# Patient Record
Sex: MALE | Race: BLACK OR AFRICAN AMERICAN | URBAN - METROPOLITAN AREA
[De-identification: names, ages, dates, MRNs, and addresses within clinical notes are randomized per-mention and may not be internally consistent; named-entity substitution may affect disease eponyms.]

---

## 2017-05-27 ENCOUNTER — EMERGENCY (EMERGENCY)
Facility: HOSPITAL | Age: 22
LOS: 1 days | Discharge: HOME | End: 2017-05-27
Admitting: EMERGENCY MEDICINE
Payer: COMMERCIAL

## 2017-05-27 VITALS
RESPIRATION RATE: 17 BRPM | OXYGEN SATURATION: 97 % | SYSTOLIC BLOOD PRESSURE: 142 MMHG | HEART RATE: 93 BPM | TEMPERATURE: 98 F | DIASTOLIC BLOOD PRESSURE: 72 MMHG

## 2017-05-27 PROCEDURE — 99284 EMERGENCY DEPT VISIT MOD MDM: CPT

## 2017-05-27 RX ORDER — FLUTICASONE PROPIONATE 50 MCG
1 SPRAY, SUSPENSION NASAL
Qty: 1 | Refills: 0 | OUTPATIENT
Start: 2017-05-27

## 2017-05-27 RX ORDER — ALBUTEROL 90 UG/1
2 AEROSOL, METERED ORAL
Qty: 1 | Refills: 0 | OUTPATIENT
Start: 2017-05-27

## 2017-05-27 NOTE — ED PROVIDER NOTE - OBJECTIVE STATEMENT
22 y/o male no PMH presents to ED c/o congestion cough wheeze x 2 days. Pt. states 2 days ago he developed congestion and facial pressure which has progressed to worsening cough and some mild wheeze - states similar symptoms one year ago and was given inhaler and cough meds and got better. denies cp fever chills nt sweates weakness.

## 2017-05-27 NOTE — ED ADULT TRIAGE NOTE - CHIEF COMPLAINT QUOTE
pt c/o of congestion and cough x 3 days. no coughing in triage. pt well appearing. rr even and unlabored.

## 2019-09-11 NOTE — ED ADULT TRIAGE NOTE - STATUS:
Applied [Follow-Up Visit] : a follow-up [Spouse] : spouse [FreeTextEntry2] : breast cancer, EGFR mutated adenocarcinoma of the lung.

## 2023-02-25 NOTE — ED ADULT TRIAGE NOTE - PAIN RATING/NUMBER SCALE (0-10): ACTIVITY
Resolved  Reports >2 beers daily, most recent 2/22/23  Developed diaphoresis, delirium, tremor, tachycardia on HD#2  CIWA-Lorazepam protocol  Empiric MVN + B12 + Folate + Thiamine  Will discuss MAT / AA prior to discharge   0

## 2023-06-28 ENCOUNTER — EMERGENCY (EMERGENCY)
Facility: HOSPITAL | Age: 28
LOS: 1 days | Discharge: HOME | End: 2023-06-28
Attending: STUDENT IN AN ORGANIZED HEALTH CARE EDUCATION/TRAINING PROGRAM
Payer: MEDICAID

## 2023-06-28 VITALS
HEART RATE: 76 BPM | RESPIRATION RATE: 18 BRPM | SYSTOLIC BLOOD PRESSURE: 120 MMHG | WEIGHT: 132.06 LBS | OXYGEN SATURATION: 98 % | TEMPERATURE: 98 F | HEIGHT: 68 IN | DIASTOLIC BLOOD PRESSURE: 72 MMHG

## 2023-06-28 VITALS
TEMPERATURE: 98 F | DIASTOLIC BLOOD PRESSURE: 73 MMHG | HEART RATE: 61 BPM | OXYGEN SATURATION: 99 % | SYSTOLIC BLOOD PRESSURE: 113 MMHG | RESPIRATION RATE: 18 BRPM

## 2023-06-28 LAB
ALBUMIN SERPL ELPH-MCNC: 3.1 G/DL — LOW (ref 3.5–5)
ALP SERPL-CCNC: 107 U/L — SIGNIFICANT CHANGE UP (ref 40–120)
ALT FLD-CCNC: 43 U/L DA — SIGNIFICANT CHANGE UP (ref 10–60)
ANION GAP SERPL CALC-SCNC: 8 MMOL/L — SIGNIFICANT CHANGE UP (ref 5–17)
AST SERPL-CCNC: 39 U/L — SIGNIFICANT CHANGE UP (ref 10–40)
BASOPHILS # BLD AUTO: 0.04 K/UL — SIGNIFICANT CHANGE UP (ref 0–0.2)
BASOPHILS NFR BLD AUTO: 0.5 % — SIGNIFICANT CHANGE UP (ref 0–2)
BILIRUB SERPL-MCNC: 0.5 MG/DL — SIGNIFICANT CHANGE UP (ref 0.2–1.2)
BUN SERPL-MCNC: 9 MG/DL — SIGNIFICANT CHANGE UP (ref 7–18)
CALCIUM SERPL-MCNC: 9 MG/DL — SIGNIFICANT CHANGE UP (ref 8.4–10.5)
CHLORIDE SERPL-SCNC: 102 MMOL/L — SIGNIFICANT CHANGE UP (ref 96–108)
CO2 SERPL-SCNC: 28 MMOL/L — SIGNIFICANT CHANGE UP (ref 22–31)
CREAT SERPL-MCNC: 0.88 MG/DL — SIGNIFICANT CHANGE UP (ref 0.5–1.3)
EGFR: 120 ML/MIN/1.73M2 — SIGNIFICANT CHANGE UP
EOSINOPHIL # BLD AUTO: 0.19 K/UL — SIGNIFICANT CHANGE UP (ref 0–0.5)
EOSINOPHIL NFR BLD AUTO: 2.2 % — SIGNIFICANT CHANGE UP (ref 0–6)
GLUCOSE SERPL-MCNC: 92 MG/DL — SIGNIFICANT CHANGE UP (ref 70–99)
HCT VFR BLD CALC: 31.8 % — LOW (ref 39–50)
HGB BLD-MCNC: 9.8 G/DL — LOW (ref 13–17)
IMM GRANULOCYTES NFR BLD AUTO: 0.4 % — SIGNIFICANT CHANGE UP (ref 0–0.9)
LACTATE SERPL-SCNC: 1.5 MMOL/L — SIGNIFICANT CHANGE UP (ref 0.7–2)
LIDOCAIN IGE QN: 147 U/L — SIGNIFICANT CHANGE UP (ref 73–393)
LYMPHOCYTES # BLD AUTO: 1.41 K/UL — SIGNIFICANT CHANGE UP (ref 1–3.3)
LYMPHOCYTES # BLD AUTO: 16.5 % — SIGNIFICANT CHANGE UP (ref 13–44)
MCHC RBC-ENTMCNC: 28.2 PG — SIGNIFICANT CHANGE UP (ref 27–34)
MCHC RBC-ENTMCNC: 30.8 GM/DL — LOW (ref 32–36)
MCV RBC AUTO: 91.6 FL — SIGNIFICANT CHANGE UP (ref 80–100)
MONOCYTES # BLD AUTO: 0.75 K/UL — SIGNIFICANT CHANGE UP (ref 0–0.9)
MONOCYTES NFR BLD AUTO: 8.8 % — SIGNIFICANT CHANGE UP (ref 2–14)
NEUTROPHILS # BLD AUTO: 6.12 K/UL — SIGNIFICANT CHANGE UP (ref 1.8–7.4)
NEUTROPHILS NFR BLD AUTO: 71.6 % — SIGNIFICANT CHANGE UP (ref 43–77)
NRBC # BLD: 0 /100 WBCS — SIGNIFICANT CHANGE UP (ref 0–0)
PLATELET # BLD AUTO: 534 K/UL — HIGH (ref 150–400)
POTASSIUM SERPL-MCNC: 3.7 MMOL/L — SIGNIFICANT CHANGE UP (ref 3.5–5.3)
POTASSIUM SERPL-SCNC: 3.7 MMOL/L — SIGNIFICANT CHANGE UP (ref 3.5–5.3)
PROT SERPL-MCNC: 7.3 G/DL — SIGNIFICANT CHANGE UP (ref 6–8.3)
RBC # BLD: 3.47 M/UL — LOW (ref 4.2–5.8)
RBC # FLD: 16.2 % — HIGH (ref 10.3–14.5)
SODIUM SERPL-SCNC: 138 MMOL/L — SIGNIFICANT CHANGE UP (ref 135–145)
WBC # BLD: 8.54 K/UL — SIGNIFICANT CHANGE UP (ref 3.8–10.5)
WBC # FLD AUTO: 8.54 K/UL — SIGNIFICANT CHANGE UP (ref 3.8–10.5)

## 2023-06-28 PROCEDURE — 85025 COMPLETE CBC W/AUTO DIFF WBC: CPT

## 2023-06-28 PROCEDURE — 83690 ASSAY OF LIPASE: CPT

## 2023-06-28 PROCEDURE — 99284 EMERGENCY DEPT VISIT MOD MDM: CPT

## 2023-06-28 PROCEDURE — 96375 TX/PRO/DX INJ NEW DRUG ADDON: CPT

## 2023-06-28 PROCEDURE — 83605 ASSAY OF LACTIC ACID: CPT

## 2023-06-28 PROCEDURE — 80053 COMPREHEN METABOLIC PANEL: CPT

## 2023-06-28 PROCEDURE — 99284 EMERGENCY DEPT VISIT MOD MDM: CPT | Mod: 25

## 2023-06-28 PROCEDURE — 96374 THER/PROPH/DIAG INJ IV PUSH: CPT

## 2023-06-28 PROCEDURE — 36415 COLL VENOUS BLD VENIPUNCTURE: CPT

## 2023-06-28 RX ORDER — SENNA PLUS 8.6 MG/1
1 TABLET ORAL
Qty: 14 | Refills: 0
Start: 2023-06-28 | End: 2023-07-11

## 2023-06-28 RX ORDER — KETOROLAC TROMETHAMINE 30 MG/ML
15 SYRINGE (ML) INJECTION ONCE
Refills: 0 | Status: DISCONTINUED | OUTPATIENT
Start: 2023-06-28 | End: 2023-06-28

## 2023-06-28 RX ORDER — ACETAMINOPHEN 500 MG
1000 TABLET ORAL ONCE
Refills: 0 | Status: COMPLETED | OUTPATIENT
Start: 2023-06-28 | End: 2023-06-28

## 2023-06-28 RX ORDER — DOCUSATE SODIUM 100 MG
1 CAPSULE ORAL
Qty: 14 | Refills: 0
Start: 2023-06-28 | End: 2023-07-11

## 2023-06-28 RX ADMIN — Medication 15 MILLIGRAM(S): at 04:11

## 2023-06-28 RX ADMIN — Medication 400 MILLIGRAM(S): at 04:30

## 2023-06-28 NOTE — ED PROVIDER NOTE - PATIENT PORTAL LINK FT
You can access the FollowMyHealth Patient Portal offered by [facility] by registering at the following website: [email or web address]. By joining [employer]’s FollowMyHealth portal, you will also be able to view your health information using other applications (apps) compatible with our system.

## 2023-06-28 NOTE — ED PROVIDER NOTE - NSFOLLOWUPINSTRUCTIONS_ED_ALL_ED_FT
You were seen in the emergency department for: constipation  Your results report is attached.   From this ED visit you were prescribed: Senna, Colace  We recommend you follow up with: your primary care doctor.    Please return to the Emergency Department if you experience any of the following symptoms:   - Shortness of breath or trouble breathing  - Pressure, pain or tightness in the chest  - Face drooping, arm weakness or speech difficulty  - Persistence of severe vomiting  - Head injury or loss of consciousness  - Nonstop bleeding or an open wound    (1) Follow up with your primary care physician within the next 24-48 hours as discussed. In addition, we did not find evidence of a life threatening illness on your testing here today, but listed below are the specialists that will be necessary to see as an outpatient to continue the workup.  Please call the numbers listed below or [phone] to set up the necessary appointments.  (2) Take Tylenol (up to 1000mg or 1 g)  and/or Motrin (up to 600mg) up to every 6 hours as needed for pain.   (3) If you had an IV (intravenous) line placed, it was removed. Sometimes, after IV removal, that area can be tender for a few days; if it develops redness and swelling, those could be signs of infection; in which case, return to the Emergency Department for assessment.  (4) Please continue taking all of your home medications as directed.

## 2023-06-28 NOTE — ED PROVIDER NOTE - PRO INTERPRETER NEED 2
"Subjective:   Shy Buchanan is a 27 y.o. female here today for   Chief Complaint   Patient presents with    Medication Refill    Referral Needed     Seizure on Saturday, Just ran out of medication     Letter for School/Work     X boss is not understanding about seizure, and she is having a hard time bouncing back      #Seizure disorder  -Chronic ongoing problem since Sept. 2020 has been treated clinic 100 mg daily.  Has been medically stable with exception of last seizure in August.  She states at the time she did not lose any consciousness but did have symptoms including concerns for convulsions, difficulty communicating, difficulty with momentary movement of extremities.  She states that she was unable to communicate at the time for several minutes until seizure passed.  She states that this was not followed up with a physician nor was it reported.  Continues with the Lamictal daily; however, is concerned about worsening symptoms given she is starting to report, which she described as \"spacing out\".  She will have several episodes of daily concentrating, absentmindedness.  She states that it is mostly occurring when having to \"make decisions\".  She also has noticed that worsening after a long day where she is extremely fatigued.  She feels like these episodes have been getting worse, occurring a couple times a week.  Denies any other seizure activity at this time.  -Because of the symptoms work is also been difficult.  She is having difficulty focusing and concentrating on spoken instructions and directives and is requesting a letter for accommodations at all instructions and directives to be written for her.    #Bipolar:  -Currently on cariprazine, just recently started a month ago.  Being followed by psychiatry continues to have some episodes of nausea/vomiting with medication for which she treats with Zofran as needed.  Symptoms are improving but does continue to have some nausea.  Requesting refill of " Zofran at this time.      Allergies   Allergen Reactions    Hydrocodone-Acetaminophen Nausea    Vicodin [Hydrocodone-Acetaminophen] Vomiting    Contrast Media With Iodine [Iodine] Hives and Itching    Latex Hives, Itching and Rash    Lidocaine Hives         Current medicines (including changes today)  Current Outpatient Medications   Medication Sig Dispense Refill    lamoTRIgine (LAMICTAL) 100 MG Tab Take 100 mg by mouth every day.      albuterol (PROVENTIL) 2.5mg/3ml Nebu Soln solution for nebulization Take 3 mL by nebulization every four hours as needed for Shortness of Breath. 1 Each 2    ibuprofen (MOTRIN) 800 MG Tab Take 1 Tablet by mouth every 8 hours as needed for Moderate Pain. 90 Tablet 0    folic acid (FOLVITE) 1 MG Tab Take 2 Tablets by mouth every day. 60 Tablet 11    dicyclomine (BENTYL) 10 MG Cap Take 10 mg by mouth 3 times a day with meals.      mometasone (ELOCON) 0.1 % Cream Apply 1 g topically as needed (For rash on hip).      montelukast (SINGULAIR) 10 MG Tab Take 10 mg by mouth every day.      nortriptyline (PAMELOR) 10 MG Cap Take 10 mg by mouth at bedtime.      Azelastine HCl 137 MCG/SPRAY Solution Administer 1 split tablet into affected nostril(S) at bedtime.      fluticasone (FLONASE) 50 MCG/ACT nasal spray Administer 1 Spray into affected nostril(S) at bedtime.      ondansetron (ZOFRAN ODT) 4 MG TABLET DISPERSIBLE Take 1 Tab by mouth every 8 hours as needed for Nausea. 10 Tab 0     No current facility-administered medications for this visit.     She  has a past medical history of Epilepsy ([facility]), Gynecological disorder, Headache (12/31/2014), Healthy adult, Heart burn, Hypertension, Indigestion, Lupus ([facility]), PONV (postoperative nausea and vomiting), and Rash (3/21/2019).    She has no past medical history of Hyperlipidemia.    ROS   Review of Systems   Constitutional:  Negative for fever.   HENT:  Negative for hearing loss.    Eyes:  Negative for blurred vision.   Respiratory:  Negative for  "shortness of breath and wheezing.    Cardiovascular:  Negative for chest pain and palpitations.   Neurological:  Negative for dizziness and headaches.        Objective:     Physical Exam:  /74   Pulse 72   Temp 36.6 °C (97.9 °F) (Temporal)   Resp 15   Ht 1.702 m (5' 7.01\")   Wt 90.7 kg (200 lb)   SpO2 100%  Body mass index is 31.32 kg/m².   Constitutional: Alert, no distress.  Skin: Warm, dry, good turgor, no rashes in visible areas.  Eye: Equal, round and reactive, conjunctiva clear, lids normal.  Respiratory: Unlabored respiratory effort  Psych: Alert and oriented x3, normal affect and mood.    Assessment and Plan:     1. Seizure (HCC)  -At this time it does appear the symptoms are unstable.  I do think it is important that she follows up with neurology at this time given recent seizure in August.  I am also concerned that patient's episodes of absentmindedness could be due to seizure activity.  She does have significant morbidities and medications which could be causing these episodes as well but is important to first rule out seizure activity.  We will refill and continue with Lamictal while we establish with neurology.  Return precautions were given patient will follow-up as needed.  - Referral to Neurology  - lamoTRIgine (LAMICTAL) 100 MG Tab; Take 1 Tablet by mouth every day.  Dispense: 30 Tablet; Refill: 6    2. Bipolar disease, chronic (HCC)  -Stable.  Patient has noticed improvement as far as mood stability.  She will continue with medications and follow-up with psychiatry as needed.  Zofran has been refilled.  - ondansetron (ZOFRAN ODT) 4 MG TABLET DISPERSIBLE; Take 1 Tablet by mouth every 8 hours as needed for Nausea/Vomiting.  Dispense: 10 Tablet; Refill: 0    3. Need for vaccination  -Patient was agreeable to receiving the influenza vaccine in clinic today after discussion of potential risks, benefits, and side effects. Vaccine was administered without adverse effects.  - INFLUENZA VACCINE " QUAD INJ (PF)    Followup: No follow-ups on file.         PLEASE NOTE: This dictation was created using voice recognition software. I have made every reasonable attempt to correct obvious errors, but I expect that there are errors of grammar and possibly content that I did not discover before finalizing the note.   English

## 2023-06-28 NOTE — ED PROVIDER NOTE - OBJECTIVE STATEMENT
27-year-old male hx of LLE traumatic fractures 1 month ago requiring 2 surgeries, on oxycodone for leg pain, presenting with constipation and abdominal pain. Notes that the constipation has gone on since starting oxycodone, decided to stop taking it yesterday. He is taking Miralax once a day. No vomiting. No hx of abdominal surgeries. No other symptoms.

## 2023-06-28 NOTE — ED ADULT NURSE NOTE - NSFALLRISKINTERV_ED_ALL_ED

## 2023-06-28 NOTE — ED PROVIDER NOTE - CLINICAL SUMMARY MEDICAL DECISION MAKING FREE TEXT BOX
27-year-old male hx of LLE traumatic fractures 1 month ago requiring 2 surgeries, on oxycodone for leg pain, presenting with constipation and abdominal pain. Exam is benign. Labs normal. Patient's pain is under control at this time, will send Senna and Amanda to the pharmacy, advised continuation of Miralax and enema.